# Patient Record
Sex: FEMALE | Race: WHITE | NOT HISPANIC OR LATINO | URBAN - METROPOLITAN AREA
[De-identification: names, ages, dates, MRNs, and addresses within clinical notes are randomized per-mention and may not be internally consistent; named-entity substitution may affect disease eponyms.]

---

## 2023-04-27 ENCOUNTER — OFFICE VISIT (OUTPATIENT)
Dept: URBAN - METROPOLITAN AREA CLINIC 9 | Facility: CLINIC | Age: 73
End: 2023-04-27

## 2023-06-23 ENCOUNTER — DASHBOARD ENCOUNTERS (OUTPATIENT)
Age: 73
End: 2023-06-23

## 2023-06-23 ENCOUNTER — OFFICE VISIT (OUTPATIENT)
Dept: URBAN - METROPOLITAN AREA CLINIC 9 | Facility: CLINIC | Age: 73
End: 2023-06-23
Payer: MEDICARE

## 2023-06-23 ENCOUNTER — WEB ENCOUNTER (OUTPATIENT)
Dept: URBAN - METROPOLITAN AREA CLINIC 9 | Facility: CLINIC | Age: 73
End: 2023-06-23

## 2023-06-23 VITALS
SYSTOLIC BLOOD PRESSURE: 130 MMHG | DIASTOLIC BLOOD PRESSURE: 74 MMHG | BODY MASS INDEX: 18.76 KG/M2 | HEIGHT: 71 IN | WEIGHT: 134 LBS

## 2023-06-23 DIAGNOSIS — K21.9 GERD WITHOUT ESOPHAGITIS: ICD-10-CM

## 2023-06-23 DIAGNOSIS — R13.19 OTHER DYSPHAGIA: ICD-10-CM

## 2023-06-23 PROBLEM — 266435005: Status: ACTIVE | Noted: 2023-06-23

## 2023-06-23 PROBLEM — 40739000: Status: ACTIVE | Noted: 2023-06-23

## 2023-06-23 PROCEDURE — 99204 OFFICE O/P NEW MOD 45 MIN: CPT | Performed by: INTERNAL MEDICINE

## 2023-06-23 RX ORDER — GABAPENTIN 300 MG/1
CAPSULE ORAL
Qty: 270 CAPSULE | Status: ACTIVE | COMMUNITY

## 2023-06-23 RX ORDER — OXYCODONE HYDROCHLORIDE AND ACETAMINOPHEN 5; 325 MG/1; MG/1
TAKE ONE TO TWO TABLETS BY MOUTH EVERY 6 HOURS AS NEEDED FOR PAIN FOR 7 DAYS TABLET ORAL
Qty: 50 UNSPECIFIED | Refills: 0 | Status: ACTIVE | COMMUNITY

## 2023-06-23 RX ORDER — NIFEDIPINE 30 MG/1
TABLET, EXTENDED RELEASE ORAL
Qty: 90 TABLET | Status: ACTIVE | COMMUNITY

## 2023-06-23 RX ORDER — ESOMEPRAZOLE MAGNESIUM 40 MG/1
CAPSULE, DELAYED RELEASE ORAL
OUTPATIENT

## 2023-06-23 RX ORDER — TRAMADOL HYDROCHLORIDE 50 MG/1
TAKE ONE TO TWO TABLETS BY MOUTH EVERY 6 HOURS AS NEEDED FOR MILD PAIN FOR 7 DAYS TABLET ORAL
Qty: 50 UNSPECIFIED | Refills: 0 | Status: ACTIVE | COMMUNITY

## 2023-06-23 RX ORDER — LOVASTATIN 20 MG/1
TABLET ORAL
Qty: 90 TABLET | Status: ACTIVE | COMMUNITY

## 2023-06-23 RX ORDER — ESOMEPRAZOLE MAGNESIUM 40 MG/1
CAPSULE, DELAYED RELEASE ORAL
Qty: 90 CAPSULE | Status: ACTIVE | COMMUNITY

## 2023-06-23 RX ORDER — BUPROPION HYDROCHLORIDE 150 MG/1
TAKE 1 TABLET BY MOUTH EVERY 12 HOURS TABLET, EXTENDED RELEASE ORAL
Qty: 180 EACH | Refills: 1 | Status: ACTIVE | COMMUNITY

## 2023-06-23 NOTE — HPI-TODAY'S VISIT:
Pt here for evaluation of dysphagia. . pt is s/p several EGDs with dilation .  Colon was neg, repeat prn . Pt is on chronic nexium She is /p  trach that may have induced a stricture . Pt has no weight loss, no bleeding. She does have gerd and the dysphagia. . She has not had a recent EGD but is having intermittent symptoms. Will plan on EGD with gentle dilation, cont nexium. RTC prn at this point. .

## 2023-07-06 ENCOUNTER — TELEPHONE ENCOUNTER (OUTPATIENT)
Dept: URBAN - METROPOLITAN AREA SURGERY CENTER 9 | Facility: SURGERY CENTER | Age: 73
End: 2023-07-06

## 2023-07-12 ENCOUNTER — OUT OF OFFICE VISIT (OUTPATIENT)
Dept: URBAN - METROPOLITAN AREA SURGERY CENTER 9 | Facility: SURGERY CENTER | Age: 73
End: 2023-07-12
Payer: MEDICARE

## 2023-07-12 ENCOUNTER — CLAIMS CREATED FROM THE CLAIM WINDOW (OUTPATIENT)
Dept: URBAN - METROPOLITAN AREA CLINIC 4 | Facility: CLINIC | Age: 73
End: 2023-07-12
Payer: MEDICARE

## 2023-07-12 DIAGNOSIS — R13.19 OTHER DYSPHAGIA: ICD-10-CM

## 2023-07-12 DIAGNOSIS — R13.10 DYSPHAGIA, UNSPECIFIED TYPE: ICD-10-CM

## 2023-07-12 DIAGNOSIS — K29.80 DUODENITIS WITHOUT BLEEDING: ICD-10-CM

## 2023-07-12 DIAGNOSIS — K22.89 OTHER SPECIFIED DISEASE OF ESOPHAGUS: ICD-10-CM

## 2023-07-12 DIAGNOSIS — K29.90 GASTRODUODENITIS, UNSPECIFIED, WITHOUT BLEEDING: ICD-10-CM

## 2023-07-12 DIAGNOSIS — K25.4 CHRONIC OR UNSPECIFIED GASTRIC ULCER WITH HEMORRHAGE: ICD-10-CM

## 2023-07-12 DIAGNOSIS — F41.9 ANXIETY: ICD-10-CM

## 2023-07-12 DIAGNOSIS — K31.89 OTHER DISEASES OF STOMACH AND DUODENUM: ICD-10-CM

## 2023-07-12 PROBLEM — 15902003: Status: ACTIVE | Noted: 2023-07-12

## 2023-07-12 PROBLEM — 40739000: Status: ACTIVE | Noted: 2023-07-12

## 2023-07-12 PROBLEM — 196731005: Status: ACTIVE | Noted: 2023-07-12

## 2023-07-12 PROBLEM — 4556007: Status: ACTIVE | Noted: 2023-07-12

## 2023-07-12 PROCEDURE — 43239 EGD BIOPSY SINGLE/MULTIPLE: CPT | Performed by: INTERNAL MEDICINE

## 2023-07-12 PROCEDURE — 88305 TISSUE EXAM BY PATHOLOGIST: CPT | Performed by: PATHOLOGY

## 2023-07-12 PROCEDURE — 00731 ANES UPR GI NDSC PX NOS: CPT | Performed by: NURSE ANESTHETIST, CERTIFIED REGISTERED

## 2023-07-12 PROCEDURE — 43239 EGD BIOPSY SINGLE/MULTIPLE: CPT | Performed by: CLINIC/CENTER

## 2023-07-12 PROCEDURE — 88342 IMHCHEM/IMCYTCHM 1ST ANTB: CPT | Performed by: PATHOLOGY

## 2023-07-12 RX ORDER — LOVASTATIN 20 MG/1
TABLET ORAL
Qty: 90 TABLET | Status: ACTIVE | COMMUNITY

## 2023-07-12 RX ORDER — ESOMEPRAZOLE MAGNESIUM 40 MG/1
CAPSULE, DELAYED RELEASE ORAL
Status: ACTIVE | COMMUNITY

## 2023-07-12 RX ORDER — OXYCODONE HYDROCHLORIDE AND ACETAMINOPHEN 5; 325 MG/1; MG/1
TAKE ONE TO TWO TABLETS BY MOUTH EVERY 6 HOURS AS NEEDED FOR PAIN FOR 7 DAYS TABLET ORAL
Qty: 50 UNSPECIFIED | Refills: 0 | Status: ACTIVE | COMMUNITY

## 2023-07-12 RX ORDER — NIFEDIPINE 30 MG/1
TABLET, EXTENDED RELEASE ORAL
Qty: 90 TABLET | Status: ACTIVE | COMMUNITY

## 2023-07-12 RX ORDER — BUPROPION HYDROCHLORIDE 150 MG/1
TAKE 1 TABLET BY MOUTH EVERY 12 HOURS TABLET, EXTENDED RELEASE ORAL
Qty: 180 EACH | Refills: 1 | Status: ACTIVE | COMMUNITY

## 2023-07-12 RX ORDER — GABAPENTIN 300 MG/1
CAPSULE ORAL
Qty: 270 CAPSULE | Status: ACTIVE | COMMUNITY

## 2023-07-12 RX ORDER — TRAMADOL HYDROCHLORIDE 50 MG/1
TAKE ONE TO TWO TABLETS BY MOUTH EVERY 6 HOURS AS NEEDED FOR MILD PAIN FOR 7 DAYS TABLET ORAL
Qty: 50 UNSPECIFIED | Refills: 0 | Status: ACTIVE | COMMUNITY

## 2023-08-02 PROBLEM — 72007001: Status: ACTIVE | Noted: 2023-08-02

## 2023-09-01 PROBLEM — 48694002: Status: ACTIVE | Noted: 2023-09-01
